# Patient Record
Sex: MALE | Race: WHITE | Employment: FULL TIME | URBAN - METROPOLITAN AREA
[De-identification: names, ages, dates, MRNs, and addresses within clinical notes are randomized per-mention and may not be internally consistent; named-entity substitution may affect disease eponyms.]

---

## 2018-04-16 ENCOUNTER — TELEPHONE (OUTPATIENT)
Dept: ORTHOPEDICS | Facility: CLINIC | Age: 58
End: 2018-04-16

## 2018-04-16 NOTE — LETTER
2018         Patient:  Sheldon Holguin  :   1960  MRN:     [id number]  Physician: AURELIO LYNCH    This is to certify that Mr. Filippo rosales is an emotional support animal.     Thank you,      Electronically signed by Aurelio Lynch MD

## 2020-03-23 ENCOUNTER — NURSE TRIAGE (OUTPATIENT)
Dept: NURSING | Facility: CLINIC | Age: 60
End: 2020-03-23

## 2020-03-23 ENCOUNTER — APPOINTMENT (OUTPATIENT)
Dept: CT IMAGING | Facility: CLINIC | Age: 60
End: 2020-03-23
Attending: PHYSICIAN ASSISTANT
Payer: COMMERCIAL

## 2020-03-23 ENCOUNTER — HOSPITAL ENCOUNTER (INPATIENT)
Facility: CLINIC | Age: 60
LOS: 4 days | Discharge: HOME | End: 2020-03-28
Attending: PHYSICIAN ASSISTANT | Admitting: INTERNAL MEDICINE
Payer: COMMERCIAL

## 2020-03-23 DIAGNOSIS — K85.90 ACUTE PANCREATITIS, UNSPECIFIED COMPLICATION STATUS, UNSPECIFIED PANCREATITIS TYPE: ICD-10-CM

## 2020-03-23 DIAGNOSIS — K85.20 ALCOHOL-INDUCED ACUTE PANCREATITIS, UNSPECIFIED COMPLICATION STATUS: Primary | ICD-10-CM

## 2020-03-23 LAB
ALBUMIN UR-MCNC: 50 MG/DL
ANION GAP SERPL CALCULATED.3IONS-SCNC: 6 MMOL/L (ref 3–14)
APPEARANCE UR: CLEAR
BASOPHILS # BLD AUTO: 0.1 10E9/L (ref 0–0.2)
BASOPHILS NFR BLD AUTO: 0.3 %
BILIRUB UR QL STRIP: NEGATIVE
BUN SERPL-MCNC: 13 MG/DL (ref 7–30)
CALCIUM SERPL-MCNC: 8.9 MG/DL (ref 8.5–10.1)
CHLORIDE SERPL-SCNC: 103 MMOL/L (ref 94–109)
CO2 SERPL-SCNC: 29 MMOL/L (ref 20–32)
COLOR UR AUTO: YELLOW
CREAT SERPL-MCNC: 1.14 MG/DL (ref 0.66–1.25)
DIFFERENTIAL METHOD BLD: ABNORMAL
EOSINOPHIL # BLD AUTO: 0.1 10E9/L (ref 0–0.7)
EOSINOPHIL NFR BLD AUTO: 0.3 %
ERYTHROCYTE [DISTWIDTH] IN BLOOD BY AUTOMATED COUNT: 13 % (ref 10–15)
GFR SERPL CREATININE-BSD FRML MDRD: 70 ML/MIN/{1.73_M2}
GLUCOSE SERPL-MCNC: 184 MG/DL (ref 70–99)
GLUCOSE UR STRIP-MCNC: NEGATIVE MG/DL
HCT VFR BLD AUTO: 49.2 % (ref 40–53)
HGB BLD-MCNC: 16.6 G/DL (ref 13.3–17.7)
HGB UR QL STRIP: NEGATIVE
IMM GRANULOCYTES # BLD: 0.1 10E9/L (ref 0–0.4)
IMM GRANULOCYTES NFR BLD: 0.5 %
KETONES UR STRIP-MCNC: 10 MG/DL
LEUKOCYTE ESTERASE UR QL STRIP: NEGATIVE
LYMPHOCYTES # BLD AUTO: 1.4 10E9/L (ref 0.8–5.3)
LYMPHOCYTES NFR BLD AUTO: 6.8 %
MCH RBC QN AUTO: 32.7 PG (ref 26.5–33)
MCHC RBC AUTO-ENTMCNC: 33.7 G/DL (ref 31.5–36.5)
MCV RBC AUTO: 97 FL (ref 78–100)
MONOCYTES # BLD AUTO: 1.7 10E9/L (ref 0–1.3)
MONOCYTES NFR BLD AUTO: 8.4 %
MUCOUS THREADS #/AREA URNS LPF: PRESENT /LPF
NEUTROPHILS # BLD AUTO: 16.8 10E9/L (ref 1.6–8.3)
NEUTROPHILS NFR BLD AUTO: 83.7 %
NITRATE UR QL: NEGATIVE
NRBC # BLD AUTO: 0 10*3/UL
NRBC BLD AUTO-RTO: 0 /100
PH UR STRIP: 5 PH (ref 5–7)
PLATELET # BLD AUTO: 300 10E9/L (ref 150–450)
POTASSIUM SERPL-SCNC: 4.2 MMOL/L (ref 3.4–5.3)
RBC # BLD AUTO: 5.07 10E12/L (ref 4.4–5.9)
RBC #/AREA URNS AUTO: 1 /HPF (ref 0–2)
SODIUM SERPL-SCNC: 138 MMOL/L (ref 133–144)
SOURCE: ABNORMAL
SP GR UR STRIP: 1.03 (ref 1–1.03)
SQUAMOUS #/AREA URNS AUTO: <1 /HPF (ref 0–1)
UROBILINOGEN UR STRIP-MCNC: NORMAL MG/DL (ref 0–2)
WBC # BLD AUTO: 20.1 10E9/L (ref 4–11)
WBC #/AREA URNS AUTO: 2 /HPF (ref 0–5)

## 2020-03-23 PROCEDURE — 80320 DRUG SCREEN QUANTALCOHOLS: CPT | Performed by: PHYSICIAN ASSISTANT

## 2020-03-23 PROCEDURE — 80048 BASIC METABOLIC PNL TOTAL CA: CPT | Performed by: PHYSICIAN ASSISTANT

## 2020-03-23 PROCEDURE — 25800030 ZZH RX IP 258 OP 636: Performed by: PHYSICIAN ASSISTANT

## 2020-03-23 PROCEDURE — 25000128 H RX IP 250 OP 636: Performed by: PHYSICIAN ASSISTANT

## 2020-03-23 PROCEDURE — 99285 EMERGENCY DEPT VISIT HI MDM: CPT | Mod: 25

## 2020-03-23 PROCEDURE — 83690 ASSAY OF LIPASE: CPT | Performed by: PHYSICIAN ASSISTANT

## 2020-03-23 PROCEDURE — 25000125 ZZHC RX 250: Performed by: PHYSICIAN ASSISTANT

## 2020-03-23 PROCEDURE — 80076 HEPATIC FUNCTION PANEL: CPT | Performed by: PHYSICIAN ASSISTANT

## 2020-03-23 PROCEDURE — 85025 COMPLETE CBC W/AUTO DIFF WBC: CPT | Performed by: PHYSICIAN ASSISTANT

## 2020-03-23 PROCEDURE — 96375 TX/PRO/DX INJ NEW DRUG ADDON: CPT

## 2020-03-23 PROCEDURE — 74177 CT ABD & PELVIS W/CONTRAST: CPT

## 2020-03-23 PROCEDURE — HZ2ZZZZ DETOXIFICATION SERVICES FOR SUBSTANCE ABUSE TREATMENT: ICD-10-PCS | Performed by: INTERNAL MEDICINE

## 2020-03-23 PROCEDURE — 96374 THER/PROPH/DIAG INJ IV PUSH: CPT | Mod: 59

## 2020-03-23 PROCEDURE — 96361 HYDRATE IV INFUSION ADD-ON: CPT

## 2020-03-23 PROCEDURE — 81001 URINALYSIS AUTO W/SCOPE: CPT | Performed by: PHYSICIAN ASSISTANT

## 2020-03-23 RX ORDER — KETOROLAC TROMETHAMINE 15 MG/ML
15 INJECTION, SOLUTION INTRAMUSCULAR; INTRAVENOUS ONCE
Status: COMPLETED | OUTPATIENT
Start: 2020-03-23 | End: 2020-03-23

## 2020-03-23 RX ORDER — HYDROMORPHONE HYDROCHLORIDE 1 MG/ML
0.5 INJECTION, SOLUTION INTRAMUSCULAR; INTRAVENOUS; SUBCUTANEOUS ONCE
Status: COMPLETED | OUTPATIENT
Start: 2020-03-23 | End: 2020-03-23

## 2020-03-23 RX ORDER — ONDANSETRON 2 MG/ML
4 INJECTION INTRAMUSCULAR; INTRAVENOUS ONCE
Status: COMPLETED | OUTPATIENT
Start: 2020-03-23 | End: 2020-03-23

## 2020-03-23 RX ORDER — IOPAMIDOL 755 MG/ML
500 INJECTION, SOLUTION INTRAVASCULAR ONCE
Status: COMPLETED | OUTPATIENT
Start: 2020-03-23 | End: 2020-03-23

## 2020-03-23 RX ADMIN — SODIUM CHLORIDE 1000 ML: 9 INJECTION, SOLUTION INTRAVENOUS at 22:32

## 2020-03-23 RX ADMIN — IOPAMIDOL 100 ML: 755 INJECTION, SOLUTION INTRAVENOUS at 23:18

## 2020-03-23 RX ADMIN — SODIUM CHLORIDE 65 ML: 9 INJECTION, SOLUTION INTRAVENOUS at 23:18

## 2020-03-23 RX ADMIN — ONDANSETRON 4 MG: 2 INJECTION INTRAMUSCULAR; INTRAVENOUS at 22:32

## 2020-03-23 RX ADMIN — HYDROMORPHONE HYDROCHLORIDE 0.5 MG: 1 INJECTION, SOLUTION INTRAMUSCULAR; INTRAVENOUS; SUBCUTANEOUS at 22:47

## 2020-03-23 RX ADMIN — KETOROLAC TROMETHAMINE 15 MG: 15 INJECTION, SOLUTION INTRAMUSCULAR; INTRAVENOUS at 22:35

## 2020-03-23 ASSESSMENT — ENCOUNTER SYMPTOMS
VOMITING: 1
DIAPHORESIS: 1
ABDOMINAL PAIN: 1
FLANK PAIN: 1
NAUSEA: 1

## 2020-03-23 ASSESSMENT — MIFFLIN-ST. JEOR: SCORE: 2082.71

## 2020-03-24 ENCOUNTER — APPOINTMENT (OUTPATIENT)
Dept: ULTRASOUND IMAGING | Facility: CLINIC | Age: 60
End: 2020-03-24
Attending: INTERNAL MEDICINE
Payer: COMMERCIAL

## 2020-03-24 ENCOUNTER — APPOINTMENT (OUTPATIENT)
Dept: GENERAL RADIOLOGY | Facility: CLINIC | Age: 60
End: 2020-03-24
Attending: INTERNAL MEDICINE
Payer: COMMERCIAL

## 2020-03-24 PROBLEM — K85.90 ACUTE PANCREATITIS: Status: ACTIVE | Noted: 2020-03-24

## 2020-03-24 PROBLEM — K85.20 ALCOHOL-INDUCED ACUTE PANCREATITIS: Status: ACTIVE | Noted: 2020-03-24

## 2020-03-24 LAB
ALBUMIN SERPL-MCNC: 4.2 G/DL (ref 3.4–5)
ALP SERPL-CCNC: 77 U/L (ref 40–150)
ALT SERPL W P-5'-P-CCNC: 33 U/L (ref 0–70)
ANION GAP SERPL CALCULATED.3IONS-SCNC: 7 MMOL/L (ref 3–14)
AST SERPL W P-5'-P-CCNC: 22 U/L (ref 0–45)
BILIRUB DIRECT SERPL-MCNC: 0.2 MG/DL (ref 0–0.2)
BILIRUB SERPL-MCNC: 0.6 MG/DL (ref 0.2–1.3)
BUN SERPL-MCNC: 14 MG/DL (ref 7–30)
CALCIUM SERPL-MCNC: 8.4 MG/DL (ref 8.5–10.1)
CHLORIDE SERPL-SCNC: 106 MMOL/L (ref 94–109)
CO2 SERPL-SCNC: 25 MMOL/L (ref 20–32)
CREAT SERPL-MCNC: 1.03 MG/DL (ref 0.66–1.25)
ERYTHROCYTE [DISTWIDTH] IN BLOOD BY AUTOMATED COUNT: 13.2 % (ref 10–15)
ETHANOL SERPL-MCNC: <0.01 G/DL
GFR SERPL CREATININE-BSD FRML MDRD: 80 ML/MIN/{1.73_M2}
GLUCOSE SERPL-MCNC: 139 MG/DL (ref 70–99)
HCT VFR BLD AUTO: 48.3 % (ref 40–53)
HGB BLD-MCNC: 15.8 G/DL (ref 13.3–17.7)
LACTATE BLD-SCNC: 1.1 MMOL/L (ref 0.7–2)
LIPASE SERPL-CCNC: 7929 U/L (ref 73–393)
LIPASE SERPL-CCNC: ABNORMAL U/L (ref 73–393)
MCH RBC QN AUTO: 33.2 PG (ref 26.5–33)
MCHC RBC AUTO-ENTMCNC: 32.7 G/DL (ref 31.5–36.5)
MCV RBC AUTO: 102 FL (ref 78–100)
PLATELET # BLD AUTO: 241 10E9/L (ref 150–450)
POTASSIUM SERPL-SCNC: 4.1 MMOL/L (ref 3.4–5.3)
PROT SERPL-MCNC: 7.6 G/DL (ref 6.8–8.8)
RBC # BLD AUTO: 4.76 10E12/L (ref 4.4–5.9)
SODIUM SERPL-SCNC: 138 MMOL/L (ref 133–144)
WBC # BLD AUTO: 14.7 10E9/L (ref 4–11)

## 2020-03-24 PROCEDURE — 25000128 H RX IP 250 OP 636: Performed by: INTERNAL MEDICINE

## 2020-03-24 PROCEDURE — 36415 COLL VENOUS BLD VENIPUNCTURE: CPT | Performed by: INTERNAL MEDICINE

## 2020-03-24 PROCEDURE — 99223 1ST HOSP IP/OBS HIGH 75: CPT | Mod: AI | Performed by: INTERNAL MEDICINE

## 2020-03-24 PROCEDURE — 80048 BASIC METABOLIC PNL TOTAL CA: CPT | Performed by: INTERNAL MEDICINE

## 2020-03-24 PROCEDURE — 83605 ASSAY OF LACTIC ACID: CPT | Performed by: INTERNAL MEDICINE

## 2020-03-24 PROCEDURE — 12000000 ZZH R&B MED SURG/OB

## 2020-03-24 PROCEDURE — U0001 2019-NCOV DIAGNOSTIC P: HCPCS | Performed by: INTERNAL MEDICINE

## 2020-03-24 PROCEDURE — 76700 US EXAM ABDOM COMPLETE: CPT

## 2020-03-24 PROCEDURE — 25000132 ZZH RX MED GY IP 250 OP 250 PS 637: Performed by: INTERNAL MEDICINE

## 2020-03-24 PROCEDURE — 71045 X-RAY EXAM CHEST 1 VIEW: CPT

## 2020-03-24 PROCEDURE — 85027 COMPLETE CBC AUTOMATED: CPT | Performed by: INTERNAL MEDICINE

## 2020-03-24 PROCEDURE — 25800030 ZZH RX IP 258 OP 636: Performed by: INTERNAL MEDICINE

## 2020-03-24 PROCEDURE — 83690 ASSAY OF LIPASE: CPT | Performed by: INTERNAL MEDICINE

## 2020-03-24 RX ORDER — NALOXONE HYDROCHLORIDE 0.4 MG/ML
.1-.4 INJECTION, SOLUTION INTRAMUSCULAR; INTRAVENOUS; SUBCUTANEOUS
Status: DISCONTINUED | OUTPATIENT
Start: 2020-03-24 | End: 2020-03-28 | Stop reason: HOSPADM

## 2020-03-24 RX ORDER — SODIUM CHLORIDE, SODIUM LACTATE, POTASSIUM CHLORIDE, CALCIUM CHLORIDE 600; 310; 30; 20 MG/100ML; MG/100ML; MG/100ML; MG/100ML
INJECTION, SOLUTION INTRAVENOUS CONTINUOUS
Status: DISCONTINUED | OUTPATIENT
Start: 2020-03-24 | End: 2020-03-25

## 2020-03-24 RX ORDER — POTASSIUM CHLORIDE 29.8 MG/ML
20 INJECTION INTRAVENOUS
Status: DISCONTINUED | OUTPATIENT
Start: 2020-03-24 | End: 2020-03-28 | Stop reason: HOSPADM

## 2020-03-24 RX ORDER — ONDANSETRON 2 MG/ML
4 INJECTION INTRAMUSCULAR; INTRAVENOUS EVERY 6 HOURS PRN
Status: DISCONTINUED | OUTPATIENT
Start: 2020-03-24 | End: 2020-03-28 | Stop reason: HOSPADM

## 2020-03-24 RX ORDER — POTASSIUM CHLORIDE 1500 MG/1
20-40 TABLET, EXTENDED RELEASE ORAL
Status: DISCONTINUED | OUTPATIENT
Start: 2020-03-24 | End: 2020-03-28 | Stop reason: HOSPADM

## 2020-03-24 RX ORDER — LIDOCAINE 40 MG/G
CREAM TOPICAL
Status: DISCONTINUED | OUTPATIENT
Start: 2020-03-24 | End: 2020-03-28 | Stop reason: HOSPADM

## 2020-03-24 RX ORDER — PROCHLORPERAZINE MALEATE 10 MG
10 TABLET ORAL EVERY 6 HOURS PRN
Status: DISCONTINUED | OUTPATIENT
Start: 2020-03-24 | End: 2020-03-28 | Stop reason: HOSPADM

## 2020-03-24 RX ORDER — PROCHLORPERAZINE 25 MG
25 SUPPOSITORY, RECTAL RECTAL EVERY 12 HOURS PRN
Status: DISCONTINUED | OUTPATIENT
Start: 2020-03-24 | End: 2020-03-28 | Stop reason: HOSPADM

## 2020-03-24 RX ORDER — ACETAMINOPHEN 325 MG/1
650 TABLET ORAL EVERY 4 HOURS PRN
Status: DISCONTINUED | OUTPATIENT
Start: 2020-03-24 | End: 2020-03-28 | Stop reason: HOSPADM

## 2020-03-24 RX ORDER — MULTIPLE VITAMINS W/ MINERALS TAB 9MG-400MCG
1 TAB ORAL DAILY
COMMUNITY

## 2020-03-24 RX ORDER — POTASSIUM CHLORIDE 7.45 MG/ML
10 INJECTION INTRAVENOUS
Status: DISCONTINUED | OUTPATIENT
Start: 2020-03-24 | End: 2020-03-28 | Stop reason: HOSPADM

## 2020-03-24 RX ORDER — BISACODYL 10 MG
10 SUPPOSITORY, RECTAL RECTAL DAILY PRN
Status: DISCONTINUED | OUTPATIENT
Start: 2020-03-24 | End: 2020-03-28 | Stop reason: HOSPADM

## 2020-03-24 RX ORDER — HYDROMORPHONE HYDROCHLORIDE 1 MG/ML
.3-.5 INJECTION, SOLUTION INTRAMUSCULAR; INTRAVENOUS; SUBCUTANEOUS
Status: DISCONTINUED | OUTPATIENT
Start: 2020-03-24 | End: 2020-03-27

## 2020-03-24 RX ORDER — MAGNESIUM SULFATE HEPTAHYDRATE 40 MG/ML
4 INJECTION, SOLUTION INTRAVENOUS EVERY 4 HOURS PRN
Status: DISCONTINUED | OUTPATIENT
Start: 2020-03-24 | End: 2020-03-28 | Stop reason: HOSPADM

## 2020-03-24 RX ORDER — ONDANSETRON 4 MG/1
4 TABLET, ORALLY DISINTEGRATING ORAL EVERY 6 HOURS PRN
Status: DISCONTINUED | OUTPATIENT
Start: 2020-03-24 | End: 2020-03-28 | Stop reason: HOSPADM

## 2020-03-24 RX ORDER — AMOXICILLIN 250 MG
1 CAPSULE ORAL 2 TIMES DAILY PRN
Status: DISCONTINUED | OUTPATIENT
Start: 2020-03-24 | End: 2020-03-28 | Stop reason: HOSPADM

## 2020-03-24 RX ORDER — AMOXICILLIN 250 MG
2 CAPSULE ORAL 2 TIMES DAILY PRN
Status: DISCONTINUED | OUTPATIENT
Start: 2020-03-24 | End: 2020-03-28 | Stop reason: HOSPADM

## 2020-03-24 RX ORDER — POTASSIUM CL/LIDO/0.9 % NACL 10MEQ/0.1L
10 INTRAVENOUS SOLUTION, PIGGYBACK (ML) INTRAVENOUS
Status: DISCONTINUED | OUTPATIENT
Start: 2020-03-24 | End: 2020-03-28 | Stop reason: HOSPADM

## 2020-03-24 RX ORDER — POTASSIUM CHLORIDE 1.5 G/1.58G
20-40 POWDER, FOR SOLUTION ORAL
Status: DISCONTINUED | OUTPATIENT
Start: 2020-03-24 | End: 2020-03-28 | Stop reason: HOSPADM

## 2020-03-24 RX ORDER — OXYCODONE HYDROCHLORIDE 5 MG/1
5-10 TABLET ORAL
Status: DISCONTINUED | OUTPATIENT
Start: 2020-03-24 | End: 2020-03-27

## 2020-03-24 RX ADMIN — HYDROMORPHONE HYDROCHLORIDE 0.5 MG: 1 INJECTION, SOLUTION INTRAMUSCULAR; INTRAVENOUS; SUBCUTANEOUS at 10:06

## 2020-03-24 RX ADMIN — OXYCODONE HYDROCHLORIDE 5 MG: 5 TABLET ORAL at 02:44

## 2020-03-24 RX ADMIN — SODIUM CHLORIDE, POTASSIUM CHLORIDE, SODIUM LACTATE AND CALCIUM CHLORIDE: 600; 310; 30; 20 INJECTION, SOLUTION INTRAVENOUS at 02:44

## 2020-03-24 RX ADMIN — SODIUM CHLORIDE, POTASSIUM CHLORIDE, SODIUM LACTATE AND CALCIUM CHLORIDE: 600; 310; 30; 20 INJECTION, SOLUTION INTRAVENOUS at 10:34

## 2020-03-24 RX ADMIN — OXYCODONE HYDROCHLORIDE 5 MG: 5 TABLET ORAL at 17:12

## 2020-03-24 RX ADMIN — OXYCODONE HYDROCHLORIDE 5 MG: 5 TABLET ORAL at 12:45

## 2020-03-24 RX ADMIN — PROCHLORPERAZINE EDISYLATE 10 MG: 5 INJECTION INTRAMUSCULAR; INTRAVENOUS at 04:14

## 2020-03-24 RX ADMIN — OXYCODONE HYDROCHLORIDE 5 MG: 5 TABLET ORAL at 20:29

## 2020-03-24 RX ADMIN — SODIUM CHLORIDE, POTASSIUM CHLORIDE, SODIUM LACTATE AND CALCIUM CHLORIDE: 600; 310; 30; 20 INJECTION, SOLUTION INTRAVENOUS at 18:33

## 2020-03-24 RX ADMIN — HYDROMORPHONE HYDROCHLORIDE 0.3 MG: 1 INJECTION, SOLUTION INTRAMUSCULAR; INTRAVENOUS; SUBCUTANEOUS at 04:03

## 2020-03-24 RX ADMIN — HYDROMORPHONE HYDROCHLORIDE 0.3 MG: 1 INJECTION, SOLUTION INTRAMUSCULAR; INTRAVENOUS; SUBCUTANEOUS at 06:08

## 2020-03-24 ASSESSMENT — ACTIVITIES OF DAILY LIVING (ADL)
ADLS_ACUITY_SCORE: 17
ADLS_ACUITY_SCORE: 19

## 2020-03-24 NOTE — ED PROVIDER NOTES
History     Chief Complaint:  Flank Pain    HPI   Sheldon Holguin is a 60 year old male who presents with flank pain. The patient reports to have had only coffee up until eating dinner and then having sudden central abdominal pain that he states is similar to the last time he had a kidney stone. He has been diaphoretic, nauseous, and vomited. He denies the pain to be localized to either flank, scrotal swelling, or scrotal pain.     Allergies:  No Known Drug Allergies     Medications:    Valium   Antivert    Past Medical History:    Kidney Stone    Past Surgical History:    Surgical history reviewed. No pertinent surgical history.     Family History:    Family history reviewed. No pertinent family history.     Social History:  Patient was not accompanied to the ED.   Smoking Status: Never Smoker  Alcohol Use: Negative   Drug Use: Positive    PCP: .Pallas, Kenneth G  Marital Status:   [2]    Review of Systems   Constitutional: Positive for diaphoresis.   Gastrointestinal: Positive for abdominal pain, nausea and vomiting.   Genitourinary: Positive for flank pain. Negative for penile pain, penile swelling, scrotal swelling and testicular pain.   All other systems reviewed and are negative.    Physical Exam   Vitals:   Patient Vitals for the past 24 hrs:   BP Temp Pulse Resp SpO2 Height Weight   03/23/20 2315 (!) 160/86 -- 74 -- 93 % -- --   03/23/20 2300 (!) 154/74 -- 75 -- 91 % -- --   03/23/20 2245 (!) 152/78 -- 60 -- 96 % -- --   03/23/20 2215 (!) 153/82 98  F (36.7  C) 63 18 96 % 1.829 m (6') 122.5 kg (270 lb)     Physical Exam  General: Alert and interactive. Appears very uncomfortable. Diaphoretic. Sitting on side of bed.   Eyes: The pupils are equal and round. EOMs intact. No scleral icterus.  ENT: No abnormalities to the external nose or ears. Mucous membranes moist. Posterior oropharynx is non-erythematous.   Neck: Trachea is in the midline. No nuchal rigidity.     CV: Regular rate and rhythm. S1 and S2  normal without murmur, click, gallop or rub.   Resp: Breath sounds are clear bilaterally, without rhonchi, wheezes, rales. Non-labored, no retractions or accessory muscle use.   GI: Abdomen is soft without distension. Tenderness diffusely throughout the abdomen, difficult to localize. No CVA tenderness.   MS: Moving all extremities well. Good muscle tone.   Skin: Warm and dry. No rash or lesions noted.  Neuro: Alert and oriented x 3. No focal neurologic deficits. Good strength and sensation in upper and lower extremities.    Psych: Awake. Alert.  Normal affect. Appropriate interactions.  Lymph: No anterior or posterior cervical lymphadenopathy noted.    Emergency Department Course     Imaging:  Radiology findings were communicated with the patient who voiced understanding of the findings.    CT Abdomen Pelvis w Contrast    1.  Evidence for acute interstitial edematous pancreatitis with diffuse enlargement of the pancreas without evidence for necrotizing pancreatitis. No ductal dilatation. Associated acute peripancreatic fluid collection conforming to the borders of the   retroperitoneum. No evidence for peripancreatic necrosis.    2.  No evidence for common bile duct dilatation. No evidence for thrombus of the splenic vein or pseudoaneurysm formation.    3.  Diffuse fatty infiltration of the liver.    4.  Low-attenuation lesion superior aspect of the spleen likely represents a cyst or hemangioma.    5.  Colonic diverticulosis without diverticulitis.    Reading per radiology     Laboratory:  Laboratory findings were communicated with the patient who voiced understanding of the findings.    UA with Microscopic: Ketone 10 (A) Protein Albumin 50 (A) Mucous present (A) o/w WNL     CBC: WBC 20.1 (H) , HGB 16.6,   BMP: Glucose 184 (H) , o/w WNL (Creatinine 1.14)    Hepatic Panel: WNL  Lipase: 28, 587  Alcohol ethyl: <0.01    Interventions:  2232 NS 1000 mL IV   2232 Zofran 4 mg IV   2235 Toradol 15 mg  IV  2247 Dilaudid 0.5 mg IV     Emergency Department Course:    2212 Nursing notes and vitals reviewed.    2225 I performed an exam of the patient as documented above.     2224 IV was inserted and blood was drawn for laboratory testing, results above.     2237 The patient provided a urine sample here in the emergency department. This was sent for laboratory testing, findings above.     2317 The patient was sent for a CT while in the emergency department, results above.      0018 Consulted with Dr. Cobb to evaluate patient.    0013 Findings and plan explained to the Patient who consents to admission. Discussed the patient with Dr. Cochran, who will admit the patient to a med [facility] bed for further monitoring, evaluation, and treatment.     Impression & Plan     Medical Decision Making:  Sheldon Holguin is a 60 year old male who presents for evaluation of diffuse abdominal pain, acute in onset this evening. The differential diagnosis would include GERD, GIB, esophageal spasm, atypical cardiac symptoms, biliary colic or gallstone disease, AAA, gastroenteritis, gastritis, bowel pathology, or others. CT shows diffuse inflammation within and surrounding the pancreas. No signs of necrotizing pancreatitis but there is a possible fluid collection surrounding pancreas. Patient has leukocytosis of 20K but no signs of electrolyte abnormalities, renal dysfunction, or anemia. Lipase is 28,000. Suspect this in part could be due to recent bout of drinking over [place]. Given CT findings, will admit for pain control, IV hydration, RUQ US, definitive management. Discussed care with Dr. Cochran, who will admit to an inpatient medical bed for further evaluation and treatment.     Diagnosis:    ICD-10-CM    1. Acute pancreatitis, unspecified complication status, unspecified pancreatitis type  K85.90 Hepatic panel     Hepatic panel     Lipase     Lipase     CANCELED: Lipase     CANCELED: Hepatic panel       Disposition:  The  patient is admitted to a medical bed     Scribe Disclosure:  I, August Wolfgang, am serving as a scribe at 11:03 PM on 3/23/2020 to document services personally performed by Pratibha Gallardo PAC based on my observations and the provider's statements to me.      [facility] EMERGENCY DEPARTMENT       Pratibha Gallardo PA-C  03/24/20 0121

## 2020-03-24 NOTE — TELEPHONE ENCOUNTER
"\"I am having lower left abdominal pain and I was having back pain. I can't really tell now it hurts so bad. I can't even straighten up all the way.(rates pain 10/10). Sx started around 6:30pm. I have had kidney stones before and that's what it feels like. I have also vomited at least 6 times.\" denies other sx. Triaged  And advised ER.  Gerda Mclain RN [facility]        Reason for Disposition    Patient sounds very sick or weak to the triager    Additional Information    Negative: Chest pain    Negative: Pain is mainly in upper abdomen  (if needed ask: \"is it mainly above the belly button?\")    Negative: Followed an abdomen (stomach) injury    Negative: [1] SEVERE pain (e.g., excruciating) AND [2] present > 1 hour    Negative: [1] SEVERE pain AND [2] age > 60    Negative: [1] Vomiting AND [2] contains red blood or black (\"coffee ground\") material  (Exception: few red streaks in vomit that only happened once)    Negative: Blood in bowel movements  (Exception: Blood on surface of BM with constipation)    Negative: Black or tarry bowel movements  (Exception: chronic-unchanged  black-grey bowel movements AND is taking iron pills or Pepto-bismol)    Negative: [1] Unable to urinate (or only a few drops) > 4 hours AND [2] bladder feels very full (e.g., palpable bladder or strong urge to urinate)    Negative: [1] Pain in the scrotum or testicle AND [2] present > 1 hour    Protocols used: ABDOMINAL PAIN - MALE-A-AH      "

## 2020-03-24 NOTE — H&P
[facility]  History and Physical   Hospitalist Service    Asael Cochran MD    Sheldon Holguin MRN# [id number]   YOB: 1960 Age: 60 year old      Date of Admission:  3/23/2020           Assessment and Plan:   Sheldon Holguin is a 60-year-old male with history of renal stone disease and vertigo.  He presented to the emergency department last night for evaluation of back and abdominal pain.  The symptoms had been dull for a couple of days but became much worse last night.  He thought he had recurrent kidney stones.  Emergency department evaluation showed pancreatitis by CT of abdomen and pelvis.  Lipase was checked and was elevated at 28,587.  Sheldon denies frequent heavy drinking but did drink quite heavily over [place] weekend.  He states he also had a couple of Scotches each night over this past weekend (2 and 3 nights ago).  He denies fevers or chills.  He has a chronic cough that he has had for months or years that he attributes to working as a  and being around chlorine all the time.  He was in [place] for [place] weekend but has not had any foreign travel.  Emergency department evaluation showed stable vital signs. Basic metabolic panel, liver function tests, and urine analysis were unremarkable.  Lipase was elevated at 28,587.  White blood cell count was elevated at 20.1 and CBC was otherwise unremarkable.  CT of abdomen and pelvis showed no ureter stones but did show acute pancreatitis.  There was also diffuse fatty liver infiltration.  I was asked to admit Sheldon to the hospital with acute pancreatitis.    Problem list:    1.  Acute pancreatitis.  This is most likely due to alcohol use.  I suspect he is minimizing his alcohol use.  He did drink heavily 12 and 13 days ago over [place] weekend in [place].  He admitted to me that he had a couple of scotches on 2 nights over this past weekend. He does have fatty liver on CT.  Treat with n.p.o., IV  fluids, analgesics, and antiemetics.  I will obtain an ultrasound.  Lipase will be repeated this morning.    2.  Leukocytosis, most likely due to acute pancreatitis.  Repeat CBC this morning.    3.  Fatty liver.     Full code  Mechanical DVT prophylaxis  Disposition: Admit as inpatient       Code Status:   Full Code         Primary Care Physician:   Pallas, Kenneth G [phone]         Chief Complaint:   Abdominal and back pain    History is obtained from Sheldon, ED provider (Pratibha Gallardo PA-C), and the medical record.         History of Present Illness:   Sheldon Holguin is a 60-year-old male with history of renal stone disease and vertigo.  He presented to the emergency department last night for evaluation of back and abdominal pain.  The symptoms had been dull for a couple of days but became much worse last night.  He thought he had recurrent kidney stones.  Emergency department evaluation showed pancreatitis by CT of abdomen and pelvis.  Lipase was checked and was elevated at 28,587.  Sheldon denies frequent heavy drinking but did drink quite heavily over [place] weekend.  He states he also had a couple of Scotches each night over this past weekend (2 and 3 nights ago).  He denies fevers or chills.  He has a chronic cough that he has had for months or years that he attributes to working as a  and being around chlorine all the time.  He was in [place] for [place] weekend but has not had any foreign travel.  Emergency department evaluation showed stable vital signs. Basic metabolic panel, liver function tests, and urine analysis were unremarkable.  Lipase was elevated at 28,587.  White blood cell count was elevated at 20.1 and CBC was otherwise unremarkable.  CT of abdomen and pelvis showed no ureter stones but did show acute pancreatitis.  There was also diffuse fatty liver infiltration.  I was asked to admit Sheldon to the hospital with acute pancreatitis.           Past Medical  History:     Patient Active Problem List   Diagnosis     Alcohol-induced acute pancreatitis      Real stone disease  Vertigo          Past Surgical History:   None         Home Medications:     Prior to Admission medications    Medication Sig Last Dose Taking? Auth Provider   diazepam (VALIUM) 5 MG tablet Take 1 tablet (5 mg) by mouth every 6 hours as needed for other (dizziness)   Jadyn Banegas MD   meclizine (ANTIVERT) 25 MG tablet Take 1 tablet (25 mg) by mouth 3 times daily as needed for dizziness   Jadyn Banegas MD            Allergies:   No Known Allergies         Social History:     Non smoker  Drinks alcohol          Family History:   No pancreatic diseae           Review of Systems:   The 10 point Review of Systems is negative other than as noted in the HPI.           Physical Exam:   Blood pressure (!) 166/78, pulse 82, temperature 98  F (36.7  C), resp. rate 18, height 1.829 m (6'), weight 122.5 kg (270 lb), SpO2 93 %.  270 lbs 0 oz      GENERAL: Pleasant and cooperative. No acute distress.  EYES: Pupils equal and round. No scleral erythema or icterus.  ENT: External ears are normal without deformity. Posterior oropharynx is without erythem, swelling, or exudate.  NECK: Supple. No masses or swelling. No tenderness. Thyroid is normal without mass or tenderness.  CHEST: Clear to auscultation. Normal breath sounds. No retractions.   CV: Regular rate and rhythm. No JVD. Pulses normal.  ABDOMEN: Bowel sounds present. Epigastric tenderness. No masses or hernia.  EXTREMETIES: No clubbing, cyanosis, or ischemia.  SKIN: Warm and dry to touch. No wounds or rashes.  NEUROLOGIC: Strength and sensation are normal. Deep tendon reflexes are normal. Cranial nerves are normal.             Data:   All new lab and imaging data was reviewed.     Results for orders placed or performed during the hospital encounter of 03/23/20 (from the past 24 hour(s))   CBC with platelets differential   Result Value Ref  Range    WBC 20.1 (H) 4.0 - 11.0 10e9/L    RBC Count 5.07 4.4 - 5.9 10e12/L    Hemoglobin 16.6 13.3 - 17.7 g/dL    Hematocrit 49.2 40.0 - 53.0 %    MCV 97 78 - 100 fl    MCH 32.7 26.5 - 33.0 pg    MCHC 33.7 31.5 - 36.5 g/dL    RDW 13.0 10.0 - 15.0 %    Platelet Count 300 150 - 450 10e9/L    Diff Method Automated Method     % Neutrophils 83.7 %    % Lymphocytes 6.8 %    % Monocytes 8.4 %    % Eosinophils 0.3 %    % Basophils 0.3 %    % Immature Granulocytes 0.5 %    Nucleated RBCs 0 0 /100    Absolute Neutrophil 16.8 (H) 1.6 - 8.3 10e9/L    Absolute Lymphocytes 1.4 0.8 - 5.3 10e9/L    Absolute Monocytes 1.7 (H) 0.0 - 1.3 10e9/L    Absolute Eosinophils 0.1 0.0 - 0.7 10e9/L    Absolute Basophils 0.1 0.0 - 0.2 10e9/L    Abs Immature Granulocytes 0.1 0 - 0.4 10e9/L    Absolute Nucleated RBC 0.0    Basic metabolic panel   Result Value Ref Range    Sodium 138 133 - 144 mmol/L    Potassium 4.2 3.4 - 5.3 mmol/L    Chloride 103 94 - 109 mmol/L    Carbon Dioxide 29 20 - 32 mmol/L    Anion Gap 6 3 - 14 mmol/L    Glucose 184 (H) 70 - 99 mg/dL    Urea Nitrogen 13 7 - 30 mg/dL    Creatinine 1.14 0.66 - 1.25 mg/dL    GFR Estimate 70 >60 mL/min/[1.73_m2]    GFR Estimate If Black 82 >60 mL/min/[1.73_m2]    Calcium 8.9 8.5 - 10.1 mg/dL   Hepatic panel   Result Value Ref Range    Bilirubin Direct 0.2 0.0 - 0.2 mg/dL    Bilirubin Total 0.6 0.2 - 1.3 mg/dL    Albumin 4.2 3.4 - 5.0 g/dL    Protein Total 7.6 6.8 - 8.8 g/dL    Alkaline Phosphatase 77 40 - 150 U/L    ALT 33 0 - 70 U/L    AST 22 0 - 45 U/L   Lipase   Result Value Ref Range    Lipase 28,587 (H) 73 - 393 U/L   Alcohol ethyl   Result Value Ref Range    Ethanol g/dL <0.01 <0.01 g/dL   UA with Microscopic   Result Value Ref Range    Color Urine Yellow     Appearance Urine Clear     Glucose Urine Negative NEG^Negative mg/dL    Bilirubin Urine Negative NEG^Negative    Ketones Urine 10 (A) NEG^Negative mg/dL    Specific Gravity Urine 1.029 1.003 - 1.035    Blood Urine Negative  NEG^Negative    pH Urine 5.0 5.0 - 7.0 pH    Protein Albumin Urine 50 (A) NEG^Negative mg/dL    Urobilinogen mg/dL Normal 0.0 - 2.0 mg/dL    Nitrite Urine Negative NEG^Negative    Leukocyte Esterase Urine Negative NEG^Negative    Source Midstream Urine     WBC Urine 2 0 - 5 /HPF    RBC Urine 1 0 - 2 /HPF    Squamous Epithelial /HPF Urine <1 0 - 1 /HPF    Mucous Urine Present (A) NEG^Negative /LPF   CT Abdomen Pelvis w Contrast    Narrative    EXAM: CT ABDOMEN PELVIS W CONTRAST  LOCATION: [facility]  DATE/TIME: 3/23/2020 11:16 PM    INDICATION: Central abdominal pain with slightly more focal tenderness in the left lower quadrant of the abdomen with guarding on physical examination.    COMPARISON: None.    TECHNIQUE: CT scan of the abdomen and pelvis was performed following injection of IV contrast. Multiplanar reformats were obtained. Dose reduction techniques were used.    CONTRAST: 100 mL Isovue-370.    FINDINGS:   LOWER CHEST: Minimal linear atelectasis involving both lower lungs. No focal infiltrate, consolidation or pleural fluid.    HEPATOBILIARY: Diffuse fatty infiltration of the liver. No calcified gallstones or biliary dilatation. Portal vein patent.    PANCREAS: Diffuse pancreatic enlargement with diffuse interstitial edema throughout the entirety of the pancreas. Findings compatible with acute interstitial edematous pancreatitis. No evidence for necrotizing pancreatitis or ductal dilatation. Minimal   to moderate peripancreatic fluid conforming to the borders of the retroperitoneum compatible with acute peripancreatic fluid collection. No evidence for pancreatic necrosis.    SPLEEN: Normal-sized spleen. Well-circumscribed low-attenuation lesion superior aspect of the spleen likely representing a small cyst or hemangioma. Splenic vein patent. No definitive evidence for splenic artery pseudoaneurysm.    ADRENAL GLANDS: No significant nodules.    KIDNEYS/BLADDER: Symmetric contrast intake into  both kidneys. Bilateral subcentimeter low-attenuation renal lesions most compatible with cysts, for which there are no further follow-up recommendations. No urinary collecting system dilatation. No overt   bladder or prostate abnormality.    BOWEL: Colonic diverticulosis without evidence for diverticulitis. Appendix unremarkable. No small bowel dilatation. Mild reactive bowel wall thickening to the second and third portions of the duodenum due to the inflammatory change along the   duodenal/pancreatic groove. There is suggestion of mild wall thickening to the greater curvature of the stomach due to adjacent inflammatory change. Distal esophagus unremarkable.    LYMPH NODES: No lymphadenopathy.    VASCULATURE: Normal caliber abdominal aorta. Atherosclerotic vascular calcification.    MUSCULOSKELETAL: Chronic bilateral L5 pars interarticularis defects without significant spondylolisthesis of L5 on S1. Minimal scattered areas of degenerative disc changes throughout the thoracic and lumbar spine.      Impression    IMPRESSION:   1.  Evidence for acute interstitial edematous pancreatitis with diffuse enlargement of the pancreas without evidence for necrotizing pancreatitis. No ductal dilatation. Associated acute peripancreatic fluid collection conforming to the borders of the   retroperitoneum. No evidence for peripancreatic necrosis.    2.  No evidence for common bile duct dilatation. No evidence for thrombus of the splenic vein or pseudoaneurysm formation.    3.  Diffuse fatty infiltration of the liver.    4.  Low-attenuation lesion superior aspect of the spleen likely represents a cyst or hemangioma.    5.  Colonic diverticulosis without diverticulitis.

## 2020-03-24 NOTE — PLAN OF CARE
Pt has pain 5/10 in abdomen not localized. Gave oxycodone and dilaudid. Decrease in pain. Compazine decreased nausea. Stand by assist. 125 ml per hour LR infusing R PIV.

## 2020-03-24 NOTE — PROVIDER NOTIFICATION
Web based paging  Pt has moderate nausea. Next Zofran is not due until 0430. Please advise.       Doctor responded and recommended Compazine.

## 2020-03-24 NOTE — PHARMACY-ADMISSION MEDICATION HISTORY
Admission medication history interview status for this patient is complete. See [facility] admission navigator for allergy information, prior to admission medications and immunization status.     Medication history interview source(s):Patient  Medication history resources (including written lists, pill bottles, clinic record): [facility] list  Primary pharmacy:[facility]    Changes made to PTA medication list:  Added: Multivit  Deleted: Valium, Meclizine.  Changed: none    Actions taken by pharmacist (provider contacted, etc):None     Additional medication history information:None    Medication reconciliation/reorder completed by provider prior to medication history? No      Prior to Admission medications    Medication Sig Last Dose Taking? Auth Provider   multivitamin w/minerals (THERA-VIT-M) tablet Take 1 tablet by mouth daily 3/22/2020 at Unknown time Yes Unknown, Entered By History

## 2020-03-24 NOTE — ED PROVIDER NOTES
Emergency Department Attending Supervision Note  3/24/2020  1:24 AM      I evaluated this patient in conjunction with Pratibha Gallardo      Briefly, the patient presented with  Abdominal pain      On my exam, well appearing, abd- BS+ TTP epigastric area, no gaurding or rebound    Results:    ED course:    My impression is Pancreatitis will admit for Bowel rest, fluid and pain control        Diagnosis    ICD-10-CM    1. Acute pancreatitis, unspecified complication status, unspecified pancreatitis type  K85.90 Hepatic panel     Hepatic panel     Lipase     Lipase     Alcohol ethyl     Alcohol ethyl     CANCELED: Lipase     CANCELED: Hepatic panel     CANCELED: Alcohol level blood         Pratibha Gallardo, *        Paul Cobb MD  03/24/20 0126

## 2020-03-24 NOTE — ED TRIAGE NOTES
Around 1800 pt started having back pain that has migrated to the front of his abd. Pt states he has had kidney stones before and this is how it feels. NV+.

## 2020-03-24 NOTE — PLAN OF CARE
BP (!) 147/78 (BP Location: Left arm)   Pulse 82   Temp 100.3  F (37.9  C) (Oral)   Resp 18   Ht 1.829 m (6')   Wt 122.5 kg (270 lb)   SpO2 92%   BMI 36.62 kg/m    Pt is A&Ox4, LS Clear on RA, Has chronic cough-infrequent. BS+, Last BM overnight, Abd pain given oxy and dilaudid for pain. Abd is rounded and firm. Denies N/T, Nausea and SOB. Good UOP. LR running at 125 ml/hr. Pt currently on Droplet/contact precautions for COVID-19 rule out, swab pending. Pt had Abd/peliv US liver fatty infiltrates. CXR cannot exclude L lung PNA, Hypoinflated lungs & bibasilar atelectasis. Up IND. Plan wait for abd pain to subside, start diet after and wait for results of COVID-19 rule out.

## 2020-03-24 NOTE — ED NOTES
[facility]  ED Nurse Handoff Report    Sheldon Holguin is a 60 year old male   ED Chief complaint: Flank Pain  . ED Diagnosis:   Final diagnoses:   Acute pancreatitis, unspecified complication status, unspecified pancreatitis type     Allergies: No Known Allergies    Code Status: Full Code  Activity level - Baseline/Home:  Independent. Activity Level - Current:   Stand by Assist. Lift room needed: No. Bariatric: No   Needed: No   Isolation: No. Infection: Not Applicable.     Vital Signs:   Vitals:    03/23/20 2215 03/23/20 2245 03/23/20 2300 03/23/20 2315   BP: (!) 153/82 (!) 152/78 (!) 154/74 (!) 160/86   Pulse: 63 60 75 74   Resp: 18      Temp: 98  F (36.7  C)      SpO2: 96% 96% 91% 93%   Weight: 122.5 kg (270 lb)      Height: 1.829 m (6')          Cardiac Rhythm:  ,      Pain level: 0-10 Pain Scale: 4  Patient confused: No. Patient Falls Risk: Yes.   Elimination Status: Has voided   Patient Report - Initial Complaint: back pain that started this evening.with history of kidney stones   Tests Performed:   Abnormal Labs Reviewed   CBC WITH PLATELETS DIFFERENTIAL - Abnormal; Notable for the following components:       Result Value    WBC 20.1 (*)     Absolute Neutrophil 16.8 (*)     Absolute Monocytes 1.7 (*)     All other components within normal limits   BASIC METABOLIC PANEL - Abnormal; Notable for the following components:    Glucose 184 (*)     All other components within normal limits   ROUTINE UA WITH MICROSCOPIC - Abnormal; Notable for the following components:    Ketones Urine 10 (*)     Protein Albumin Urine 50 (*)     Mucous Urine Present (*)     All other components within normal limits   . Abnormal Results: see above, Abd CT-pancreatitis   Treatments provided: meds, IVF  Family Comments: family aware of admission  OBS brochure/video discussed/provided to patient:  N/A  ED Medications:   Medications   0.9% sodium chloride BOLUS (1,000 mLs Intravenous New Bag 3/23/20 2232)    ondansetron (ZOFRAN) injection 4 mg (4 mg Intravenous Given 3/23/20 2232)   ketorolac (TORADOL) injection 15 mg (15 mg Intravenous Given 3/23/20 2235)   HYDROmorphone (PF) (DILAUDID) injection 0.5 mg (0.5 mg Intravenous Given 3/23/20 2247)   CT Scan Flush (65 mLs Intravenous Given 3/23/20 2318)   iopamidol (ISOVUE-370) solution 500 mL (100 mLs Intravenous Given 3/23/20 2318)     Drips infusing:  Yes  For the majority of the shift, the patient's behavior Green.    Sepsis treatment initiated: No       ED Nurse Name/Phone Number: Lissa Boyer RN,   12:30 AM  RECEIVING UNIT ED HANDOFF REVIEW    Above ED Nurse Handoff Report was reviewed: Yes  Reviewed by: Sharri Gant on March 24, 2020 at 1:26 AM

## 2020-03-24 NOTE — PROGRESS NOTES
Patient seen and examined.  Chart reviewed.  Please see admission history and physical by Dr. Cochran from earlier this morning for details.    Continue to treat acute pancreatitis with n.p.o. diet status, IV fluids, and pain medications as needed.    Of note, nurse had notified me that patient had a cough and low-grade temperature elevation prior to entering.  Also has recent travel to [place].    Both the nurse and I wore personal protective equipment into the room to evaluate patient today.    COVID 19 testing sent.

## 2020-03-25 LAB
ALBUMIN SERPL-MCNC: 3.7 G/DL (ref 3.4–5)
ALP SERPL-CCNC: 69 U/L (ref 40–150)
ALT SERPL W P-5'-P-CCNC: 20 U/L (ref 0–70)
ANION GAP SERPL CALCULATED.3IONS-SCNC: 4 MMOL/L (ref 3–14)
AST SERPL W P-5'-P-CCNC: 14 U/L (ref 0–45)
BILIRUB SERPL-MCNC: 1.5 MG/DL (ref 0.2–1.3)
BUN SERPL-MCNC: 9 MG/DL (ref 7–30)
CALCIUM SERPL-MCNC: 8.5 MG/DL (ref 8.5–10.1)
CHLORIDE SERPL-SCNC: 105 MMOL/L (ref 94–109)
CO2 SERPL-SCNC: 28 MMOL/L (ref 20–32)
CREAT SERPL-MCNC: 0.92 MG/DL (ref 0.66–1.25)
ERYTHROCYTE [DISTWIDTH] IN BLOOD BY AUTOMATED COUNT: 13.5 % (ref 10–15)
GFR SERPL CREATININE-BSD FRML MDRD: >90 ML/MIN/{1.73_M2}
GLUCOSE SERPL-MCNC: 106 MG/DL (ref 70–99)
HCT VFR BLD AUTO: 49.2 % (ref 40–53)
HGB BLD-MCNC: 16.1 G/DL (ref 13.3–17.7)
LIPASE SERPL-CCNC: 1893 U/L (ref 73–393)
MAGNESIUM SERPL-MCNC: 2 MG/DL (ref 1.6–2.3)
MCH RBC QN AUTO: 33.4 PG (ref 26.5–33)
MCHC RBC AUTO-ENTMCNC: 32.7 G/DL (ref 31.5–36.5)
MCV RBC AUTO: 102 FL (ref 78–100)
PLATELET # BLD AUTO: 230 10E9/L (ref 150–450)
POTASSIUM SERPL-SCNC: 4 MMOL/L (ref 3.4–5.3)
PROT SERPL-MCNC: 7.3 G/DL (ref 6.8–8.8)
RBC # BLD AUTO: 4.82 10E12/L (ref 4.4–5.9)
SODIUM SERPL-SCNC: 137 MMOL/L (ref 133–144)
WBC # BLD AUTO: 18.2 10E9/L (ref 4–11)

## 2020-03-25 PROCEDURE — 25800030 ZZH RX IP 258 OP 636: Performed by: INTERNAL MEDICINE

## 2020-03-25 PROCEDURE — 83735 ASSAY OF MAGNESIUM: CPT | Performed by: INTERNAL MEDICINE

## 2020-03-25 PROCEDURE — 25000128 H RX IP 250 OP 636: Performed by: INTERNAL MEDICINE

## 2020-03-25 PROCEDURE — 85027 COMPLETE CBC AUTOMATED: CPT | Performed by: INTERNAL MEDICINE

## 2020-03-25 PROCEDURE — 25000125 ZZHC RX 250: Performed by: INTERNAL MEDICINE

## 2020-03-25 PROCEDURE — 83690 ASSAY OF LIPASE: CPT | Performed by: INTERNAL MEDICINE

## 2020-03-25 PROCEDURE — 99232 SBSQ HOSP IP/OBS MODERATE 35: CPT | Performed by: INTERNAL MEDICINE

## 2020-03-25 PROCEDURE — 12000000 ZZH R&B MED SURG/OB

## 2020-03-25 PROCEDURE — 25000132 ZZH RX MED GY IP 250 OP 250 PS 637: Performed by: INTERNAL MEDICINE

## 2020-03-25 PROCEDURE — 80053 COMPREHEN METABOLIC PANEL: CPT | Performed by: INTERNAL MEDICINE

## 2020-03-25 RX ORDER — HYDRALAZINE HYDROCHLORIDE 20 MG/ML
10 INJECTION INTRAMUSCULAR; INTRAVENOUS EVERY 4 HOURS PRN
Status: DISCONTINUED | OUTPATIENT
Start: 2020-03-25 | End: 2020-03-28 | Stop reason: HOSPADM

## 2020-03-25 RX ORDER — LORAZEPAM 2 MG/ML
1-2 INJECTION INTRAMUSCULAR EVERY 30 MIN PRN
Status: DISCONTINUED | OUTPATIENT
Start: 2020-03-25 | End: 2020-03-27

## 2020-03-25 RX ORDER — CEFTRIAXONE 2 G/1
2 INJECTION, POWDER, FOR SOLUTION INTRAMUSCULAR; INTRAVENOUS EVERY 24 HOURS
Status: DISCONTINUED | OUTPATIENT
Start: 2020-03-25 | End: 2020-03-28 | Stop reason: HOSPADM

## 2020-03-25 RX ORDER — SODIUM CHLORIDE 9 MG/ML
INJECTION, SOLUTION INTRAVENOUS CONTINUOUS
Status: DISCONTINUED | OUTPATIENT
Start: 2020-03-25 | End: 2020-03-27

## 2020-03-25 RX ORDER — METOPROLOL TARTRATE 1 MG/ML
5 INJECTION, SOLUTION INTRAVENOUS EVERY 6 HOURS
Status: DISCONTINUED | OUTPATIENT
Start: 2020-03-25 | End: 2020-03-27

## 2020-03-25 RX ORDER — LORAZEPAM 1 MG/1
1-2 TABLET ORAL EVERY 30 MIN PRN
Status: DISCONTINUED | OUTPATIENT
Start: 2020-03-25 | End: 2020-03-27

## 2020-03-25 RX ADMIN — OXYCODONE HYDROCHLORIDE 5 MG: 5 TABLET ORAL at 06:30

## 2020-03-25 RX ADMIN — OXYCODONE HYDROCHLORIDE 5 MG: 5 TABLET ORAL at 12:07

## 2020-03-25 RX ADMIN — AZITHROMYCIN MONOHYDRATE 500 MG: 500 INJECTION, POWDER, LYOPHILIZED, FOR SOLUTION INTRAVENOUS at 10:20

## 2020-03-25 RX ADMIN — OXYCODONE HYDROCHLORIDE 5 MG: 5 TABLET ORAL at 18:13

## 2020-03-25 RX ADMIN — SODIUM CHLORIDE, POTASSIUM CHLORIDE, SODIUM LACTATE AND CALCIUM CHLORIDE: 600; 310; 30; 20 INJECTION, SOLUTION INTRAVENOUS at 02:30

## 2020-03-25 RX ADMIN — SODIUM CHLORIDE, PRESERVATIVE FREE: 5 INJECTION INTRAVENOUS at 08:55

## 2020-03-25 RX ADMIN — CEFTRIAXONE 2 G: 2 INJECTION, POWDER, FOR SOLUTION INTRAMUSCULAR; INTRAVENOUS at 08:56

## 2020-03-25 RX ADMIN — HYDROMORPHONE HYDROCHLORIDE 0.5 MG: 1 INJECTION, SOLUTION INTRAMUSCULAR; INTRAVENOUS; SUBCUTANEOUS at 00:24

## 2020-03-25 RX ADMIN — OXYCODONE HYDROCHLORIDE 5 MG: 5 TABLET ORAL at 09:28

## 2020-03-25 RX ADMIN — METOPROLOL TARTRATE 5 MG: 5 INJECTION INTRAVENOUS at 08:56

## 2020-03-25 RX ADMIN — METOPROLOL TARTRATE 5 MG: 5 INJECTION INTRAVENOUS at 14:39

## 2020-03-25 RX ADMIN — OXYCODONE HYDROCHLORIDE 10 MG: 5 TABLET ORAL at 02:33

## 2020-03-25 RX ADMIN — OXYCODONE HYDROCHLORIDE 5 MG: 5 TABLET ORAL at 15:04

## 2020-03-25 RX ADMIN — METOPROLOL TARTRATE 5 MG: 5 INJECTION INTRAVENOUS at 20:18

## 2020-03-25 RX ADMIN — FOLIC ACID: 5 INJECTION, SOLUTION INTRAMUSCULAR; INTRAVENOUS; SUBCUTANEOUS at 13:26

## 2020-03-25 RX ADMIN — OXYCODONE HYDROCHLORIDE 5 MG: 5 TABLET ORAL at 21:59

## 2020-03-25 ASSESSMENT — ACTIVITIES OF DAILY LIVING (ADL)
ADLS_ACUITY_SCORE: 19
ADLS_ACUITY_SCORE: 12
ADLS_ACUITY_SCORE: 12
ADLS_ACUITY_SCORE: 19

## 2020-03-25 NOTE — PROGRESS NOTES
[facility]    Medicine Progress Note - Hospitalist Service       Date of Admission:  3/23/2020  Assessment & Plan   Mr. Holguin is a 60-year-old male with a past medical history significant for nephrolithiasis.  He had initially presented to emergency room with abdominal pain.  He was found to have acute pancreatitis.  This is likely related to recent binge drinking on a trip to [place].    1.  Acute pancreatitis.  Likely related to recent heavy alcohol use.  Symptoms are mildly improved today.  Continue n.p.o. diet status at this time per patient request.  Lipase numbers to continue to improve.  Start clear liquid diet when he feels that he is able to tolerate.  Continue pain medications as needed.  IV fluids while n.p.o.  Did advise avoidance of alcohol in the future.    2.  Possible pneumonia.  Did have some very mild abnormalities on chest x-ray from yesterday.  Developed fever to 100.7  F yesterday afternoon.  Does have a cough.  Now start antibiotics with azithromycin and ceftriaxone.  COVID 19 testing in progress.  He does remain in respiratory isolation at this time while this test is pending.    3.  Hypertension.  Start metoprolol 5 mg IV every 6 hours.  IV hydralazine if needed.    4.  Possible alcohol withdrawals.  Start Lorazepam withdrawal protocol with IV vitamins.      Diet: NPO for Medical/Clinical Reasons Except for: Meds, Ice Chips    DVT Prophylaxis: Pneumatic Compression Devices  Garcia Catheter: not present  Code Status: Full Code      Disposition Plan   Expected discharge: 1-2 days, recommended to prior living arrangement     Spencer Gan, DO  Hospitalist Service  [facility]    ______________________________________________________________________    Interval History   Having occasional abdominal pain.  Improved from previous.  Occasional nausea. no longer having a cough.  Did have fevers yesterday.  No chest pain, shortness of breath, or diarrhea.    Data  reviewed today: I reviewed all medications, new labs and imaging results over the last 24 hours.     Physical Exam   Vital Signs: Temp: 100.3  F (37.9  C) Temp src: Oral BP: (!) 165/86 Pulse: 104 Heart Rate: 94 Resp: 16 SpO2: 94 % O2 Device: None (Room air)    Weight: 270 lbs 0 oz  Gen:  NAD, A&Ox3.  Eyes:  PERRL, sclera anicteric.  OP:  MMM, no lesions.  Neck:  Supple.  CV:  Regular, no murmurs.  Lung:  CTA b/l, normal effort.  Ab:  +BS, soft.  Skin:  Warm, dry to touch.  No rash.  Ext:  No pitting edema LE b/l.      Data   Recent Labs   Lab 03/25/20  0622 03/24/20  0650 03/23/20  2224   WBC 18.2* 14.7* 20.1*   HGB 16.1 15.8 16.6   * 102* 97    241 300    138 138   POTASSIUM 4.0 4.1 4.2   CHLORIDE 105 106 103   CO2 28 25 29   BUN 9 14 13   CR 0.92 1.03 1.14   ANIONGAP 4 7 6   MIRIAN 8.5 8.4* 8.9   * 139* 184*   ALBUMIN 3.7  --  4.2   PROTTOTAL 7.3  --  7.6   BILITOTAL 1.5*  --  0.6   ALKPHOS 69  --  77   ALT 20  --  33   AST 14  --  22   LIPASE 1,893* 7,929* 28,587*

## 2020-03-25 NOTE — PLAN OF CARE
/76 (BP Location: Right arm)   Pulse 104   Temp 98.7  F (37.1  C) (Oral)   Resp 16   Ht 1.829 m (6')   Wt 122.5 kg (270 lb)   SpO2 94%   BMI 36.62 kg/m     BP elevated managed w/ IV lopressor Q 6 hrs.  Pt is A&Ox4, LS Diminished on RA,  BS+, Abd pain given oxy x 4 for pain 2-6/10. Abd is rounded and slightly tender. NPO w/ ice chips. Denies N/T, Nausea and SOB. Good UOP. Pt has +1 edema to R ankle, states normal. NS running at 100 ml/hr. Receiving infuvite Q24 @ 100 cc/hr. Started CIWA scored 5 & 5. Getting IV Rocephin and Zithromax Q 24 hrs.  COVID-19 ruled out, Test Negative. Up IND. Plan wait for abd pain to subside, start diet after.

## 2020-03-25 NOTE — PLAN OF CARE
A&O x 4, independent, NPO except meds & ice chips, contact & droplet iso for R/O COVID, O2 92-93% RA, elevated temp, LR infusing 125 mL/hr, oxy & dilaudid given for abdomen pain, will continue with POC.

## 2020-03-26 LAB
ALBUMIN SERPL-MCNC: 3.3 G/DL (ref 3.4–5)
ALP SERPL-CCNC: 60 U/L (ref 40–150)
ALT SERPL W P-5'-P-CCNC: 18 U/L (ref 0–70)
ANION GAP SERPL CALCULATED.3IONS-SCNC: 6 MMOL/L (ref 3–14)
AST SERPL W P-5'-P-CCNC: 14 U/L (ref 0–45)
BILIRUB SERPL-MCNC: 1.2 MG/DL (ref 0.2–1.3)
BUN SERPL-MCNC: 12 MG/DL (ref 7–30)
CALCIUM SERPL-MCNC: 8.3 MG/DL (ref 8.5–10.1)
CHLORIDE SERPL-SCNC: 104 MMOL/L (ref 94–109)
CO2 SERPL-SCNC: 25 MMOL/L (ref 20–32)
COVID-19 VIRUS PCR RESULT FROM MDH: NEGATIVE
CREAT SERPL-MCNC: 0.93 MG/DL (ref 0.66–1.25)
GFR SERPL CREATININE-BSD FRML MDRD: >90 ML/MIN/{1.73_M2}
GLUCOSE SERPL-MCNC: 96 MG/DL (ref 70–99)
LIPASE SERPL-CCNC: 329 U/L (ref 73–393)
POTASSIUM SERPL-SCNC: 3.9 MMOL/L (ref 3.4–5.3)
PROT SERPL-MCNC: 6.8 G/DL (ref 6.8–8.8)
SODIUM SERPL-SCNC: 135 MMOL/L (ref 133–144)

## 2020-03-26 PROCEDURE — 25000125 ZZHC RX 250: Performed by: INTERNAL MEDICINE

## 2020-03-26 PROCEDURE — 99207 ZZC MOONLIGHTING INDICATOR: CPT | Performed by: INTERNAL MEDICINE

## 2020-03-26 PROCEDURE — 25000128 H RX IP 250 OP 636: Performed by: INTERNAL MEDICINE

## 2020-03-26 PROCEDURE — 36415 COLL VENOUS BLD VENIPUNCTURE: CPT | Performed by: INTERNAL MEDICINE

## 2020-03-26 PROCEDURE — 12000000 ZZH R&B MED SURG/OB

## 2020-03-26 PROCEDURE — 25000132 ZZH RX MED GY IP 250 OP 250 PS 637: Performed by: INTERNAL MEDICINE

## 2020-03-26 PROCEDURE — 99233 SBSQ HOSP IP/OBS HIGH 50: CPT | Performed by: INTERNAL MEDICINE

## 2020-03-26 PROCEDURE — 25800030 ZZH RX IP 258 OP 636: Performed by: INTERNAL MEDICINE

## 2020-03-26 PROCEDURE — 83690 ASSAY OF LIPASE: CPT | Performed by: INTERNAL MEDICINE

## 2020-03-26 PROCEDURE — 80053 COMPREHEN METABOLIC PANEL: CPT | Performed by: INTERNAL MEDICINE

## 2020-03-26 RX ADMIN — OXYCODONE HYDROCHLORIDE 10 MG: 5 TABLET ORAL at 22:47

## 2020-03-26 RX ADMIN — OXYCODONE HYDROCHLORIDE 10 MG: 5 TABLET ORAL at 02:52

## 2020-03-26 RX ADMIN — CEFTRIAXONE 2 G: 2 INJECTION, POWDER, FOR SOLUTION INTRAMUSCULAR; INTRAVENOUS at 07:45

## 2020-03-26 RX ADMIN — FOLIC ACID: 5 INJECTION, SOLUTION INTRAMUSCULAR; INTRAVENOUS; SUBCUTANEOUS at 15:20

## 2020-03-26 RX ADMIN — METOPROLOL TARTRATE 5 MG: 5 INJECTION INTRAVENOUS at 02:38

## 2020-03-26 RX ADMIN — METOPROLOL TARTRATE 5 MG: 5 INJECTION INTRAVENOUS at 07:45

## 2020-03-26 RX ADMIN — AZITHROMYCIN MONOHYDRATE 500 MG: 500 INJECTION, POWDER, LYOPHILIZED, FOR SOLUTION INTRAVENOUS at 08:47

## 2020-03-26 RX ADMIN — OXYCODONE HYDROCHLORIDE 5 MG: 5 TABLET ORAL at 07:45

## 2020-03-26 RX ADMIN — METOPROLOL TARTRATE 5 MG: 5 INJECTION INTRAVENOUS at 15:12

## 2020-03-26 RX ADMIN — OXYCODONE HYDROCHLORIDE 10 MG: 5 TABLET ORAL at 15:20

## 2020-03-26 RX ADMIN — HYDROMORPHONE HYDROCHLORIDE 0.5 MG: 1 INJECTION, SOLUTION INTRAMUSCULAR; INTRAVENOUS; SUBCUTANEOUS at 01:09

## 2020-03-26 RX ADMIN — METOPROLOL TARTRATE 5 MG: 5 INJECTION INTRAVENOUS at 20:41

## 2020-03-26 RX ADMIN — OXYCODONE HYDROCHLORIDE 10 MG: 5 TABLET ORAL at 19:07

## 2020-03-26 RX ADMIN — SODIUM CHLORIDE, PRESERVATIVE FREE: 5 INJECTION INTRAVENOUS at 06:21

## 2020-03-26 RX ADMIN — OXYCODONE HYDROCHLORIDE 5 MG: 5 TABLET ORAL at 11:55

## 2020-03-26 ASSESSMENT — ACTIVITIES OF DAILY LIVING (ADL)
ADLS_ACUITY_SCORE: 12

## 2020-03-26 NOTE — PROGRESS NOTES
[facility]    Medicine Progress Note - Hospitalist Service       Date of Admission:  3/23/2020  Assessment & Plan   Mr. Holguin is a 60-year-old male with a past medical history significant for nephrolithiasis.  He had initially presented to emergency room with abdominal pain.  He was found to have acute pancreatitis.  This is likely related to recent binge drinking on a trip to [place].    1.  Acute pancreatitis.  Likely related to recent heavy alcohol use.  Symptoms are mildly improved today.  Advanced to clear liquids. Did advise avoidance of alcohol in the future.    2.  Possible pneumonia.  Did have some very mild abnormalities on chest x-ray from yesterday.  Developed fever to 100.7  F yesterday afternoon.  Does have a cough.  Now start antibiotics with azithromycin and ceftriaxone.  COVID 19 testing negative.     3.  Hypertension.  Start metoprolol 5 mg IV every 6 hours.  IV hydralazine if needed.    4.  Possible alcohol withdrawals.  Start Lorazepam withdrawal protocol with IV vitamins.      Diet: Advance Diet as Tolerated: Clear Liquid Diet    DVT Prophylaxis: Pneumatic Compression Devices  Garcia Catheter: not present  Code Status: Full Code      Disposition Plan   Expected discharge: 1-2 days, recommended to prior living arrangement     Ronnie yHde MD  Hospitalist Service  [facility]    _______________________________________________________    Interval History   Advanced to clear liquid diet. No chest pain, shortness of breath, or diarrhea. COVID-19 negative.     Data reviewed today: I reviewed all medications, new labs and imaging results over the last 24 hours.     Physical Exam   Vital Signs: Temp: 93.2  F (34  C) Temp src: Oral BP: (!) 145/74   Heart Rate: 83 Resp: 16 SpO2: 96 % O2 Device: None (Room air)    Weight: 270 lbs 0 oz  Gen:  NAD, A&Ox3.  Eyes:  PERRL, sclera anicteric.  OP:  MMM, no lesions.  Neck:  Supple.  CV:  Regular, no murmurs.  Lung:  CTA b/l, normal  effort.  Ab:  +BS, soft.  Skin:  Warm, dry to touch.  No rash.  Ext:  No pitting edema LE b/l.      Data   Recent Labs   Lab 03/26/20  0616 03/25/20  0622 03/24/20  0650 03/23/20  2224   WBC  --  18.2* 14.7* 20.1*   HGB  --  16.1 15.8 16.6   MCV  --  102* 102* 97   PLT  --  230 241 300    137 138 138   POTASSIUM 3.9 4.0 4.1 4.2   CHLORIDE 104 105 106 103   CO2 25 28 25 29   BUN 12 9 14 13   CR 0.93 0.92 1.03 1.14   ANIONGAP 6 4 7 6   MIRIAN 8.3* 8.5 8.4* 8.9   GLC 96 106* 139* 184*   ALBUMIN 3.3* 3.7  --  4.2   PROTTOTAL 6.8 7.3  --  7.6   BILITOTAL 1.2 1.5*  --  0.6   ALKPHOS 60 69  --  77   ALT 18 20  --  33   AST 14 14  --  22   LIPASE 329 1,893* 7,929* 28,587*

## 2020-03-26 NOTE — PLAN OF CARE
Presentation/Diagnosis: abdominal pain, fever; alcohol-induced acute pancreatitis. COVID-19 test negative.  Vitals: /77 (BP Location: Right arm)   Pulse 104   Temp 99.4  F (37.4  C) (Oral)   Resp 18   Ht 1.829 m (6')   Wt 122.5 kg (270 lb)   SpO2 90%   BMI 36.62 kg/m      Cardiac: bilateral edema to LE  Respiratory: WNL  Neuro: A&O x4  GI/: Pt reporting abdominal pain. Worse when lying on L or R side. Dilaudid and oxycodone given.  Skin: WNL  LDAs: piv infusing  Plan: possible discharge today.

## 2020-03-26 NOTE — PLAN OF CARE
BP (!) 155/73 (BP Location: Left arm)   Pulse 104   Temp 98.7  F (37.1  C) (Oral)   Resp 16   Ht 1.829 m (6')   Wt 122.5 kg (270 lb)   SpO2 96%   BMI 36.62 kg/m     BP elevated managed w/ IV lopressor Q 6 hrs.  Pt is A&Ox4, LS Diminished on RA,  BS+ Passing gas, Abd pain given oxy x 4 for pain 5/10. Abd is rounded and slightly tender. Clear liquid and tolerating, can be advanced to full liquid diet. Denies N/T, Nausea and SOB. Good UOP. Pt has +1 edema to R ankle, states normal. NS running at 100 ml/hr. Receiving infuvite Q24 @ 100 cc/hr. CIWA scored 1, 3, 3. Temp has fluctuated between 98 and 100.3 F. Getting IV Rocephin and Zithromax Q 24 hrs.  Up IND. Plan to discharge in 1-2 days to prior living arrangement.

## 2020-03-27 LAB
ANION GAP SERPL CALCULATED.3IONS-SCNC: 5 MMOL/L (ref 3–14)
BUN SERPL-MCNC: 11 MG/DL (ref 7–30)
CALCIUM SERPL-MCNC: 8.1 MG/DL (ref 8.5–10.1)
CHLORIDE SERPL-SCNC: 103 MMOL/L (ref 94–109)
CO2 SERPL-SCNC: 27 MMOL/L (ref 20–32)
CREAT SERPL-MCNC: 0.93 MG/DL (ref 0.66–1.25)
ERYTHROCYTE [DISTWIDTH] IN BLOOD BY AUTOMATED COUNT: 13.2 % (ref 10–15)
GFR SERPL CREATININE-BSD FRML MDRD: >90 ML/MIN/{1.73_M2}
GLUCOSE SERPL-MCNC: 90 MG/DL (ref 70–99)
HCT VFR BLD AUTO: 43.2 % (ref 40–53)
HGB BLD-MCNC: 14.1 G/DL (ref 13.3–17.7)
LACTATE BLD-SCNC: 0.7 MMOL/L (ref 0.7–2)
MCH RBC QN AUTO: 32.6 PG (ref 26.5–33)
MCHC RBC AUTO-ENTMCNC: 32.6 G/DL (ref 31.5–36.5)
MCV RBC AUTO: 100 FL (ref 78–100)
PLATELET # BLD AUTO: 217 10E9/L (ref 150–450)
POTASSIUM SERPL-SCNC: 3.7 MMOL/L (ref 3.4–5.3)
RBC # BLD AUTO: 4.33 10E12/L (ref 4.4–5.9)
SODIUM SERPL-SCNC: 135 MMOL/L (ref 133–144)
WBC # BLD AUTO: 16.8 10E9/L (ref 4–11)

## 2020-03-27 PROCEDURE — 80048 BASIC METABOLIC PNL TOTAL CA: CPT | Performed by: INTERNAL MEDICINE

## 2020-03-27 PROCEDURE — 25000128 H RX IP 250 OP 636: Performed by: INTERNAL MEDICINE

## 2020-03-27 PROCEDURE — 25000132 ZZH RX MED GY IP 250 OP 250 PS 637: Performed by: INTERNAL MEDICINE

## 2020-03-27 PROCEDURE — 36415 COLL VENOUS BLD VENIPUNCTURE: CPT | Performed by: INTERNAL MEDICINE

## 2020-03-27 PROCEDURE — 25800030 ZZH RX IP 258 OP 636: Performed by: INTERNAL MEDICINE

## 2020-03-27 PROCEDURE — 83605 ASSAY OF LACTIC ACID: CPT | Performed by: INTERNAL MEDICINE

## 2020-03-27 PROCEDURE — 12000000 ZZH R&B MED SURG/OB

## 2020-03-27 PROCEDURE — 25000125 ZZHC RX 250: Performed by: INTERNAL MEDICINE

## 2020-03-27 PROCEDURE — 85027 COMPLETE CBC AUTOMATED: CPT | Performed by: INTERNAL MEDICINE

## 2020-03-27 PROCEDURE — 99232 SBSQ HOSP IP/OBS MODERATE 35: CPT | Performed by: INTERNAL MEDICINE

## 2020-03-27 RX ORDER — LANOLIN ALCOHOL/MO/W.PET/CERES
100 CREAM (GRAM) TOPICAL DAILY
Status: DISCONTINUED | OUTPATIENT
Start: 2020-03-27 | End: 2020-03-28 | Stop reason: HOSPADM

## 2020-03-27 RX ORDER — OXYCODONE HYDROCHLORIDE 5 MG/1
5 TABLET ORAL
Status: DISCONTINUED | OUTPATIENT
Start: 2020-03-27 | End: 2020-03-28 | Stop reason: HOSPADM

## 2020-03-27 RX ORDER — AZITHROMYCIN 250 MG/1
250 TABLET, FILM COATED ORAL DAILY
Status: DISCONTINUED | OUTPATIENT
Start: 2020-03-28 | End: 2020-03-28 | Stop reason: HOSPADM

## 2020-03-27 RX ORDER — MULTIPLE VITAMINS W/ MINERALS TAB 9MG-400MCG
1 TAB ORAL DAILY
Status: DISCONTINUED | OUTPATIENT
Start: 2020-03-27 | End: 2020-03-28 | Stop reason: HOSPADM

## 2020-03-27 RX ORDER — FOLIC ACID 1 MG/1
1 TABLET ORAL DAILY
Status: DISCONTINUED | OUTPATIENT
Start: 2020-03-27 | End: 2020-03-28 | Stop reason: HOSPADM

## 2020-03-27 RX ADMIN — OXYCODONE HYDROCHLORIDE 5 MG: 5 TABLET ORAL at 10:21

## 2020-03-27 RX ADMIN — ACETAMINOPHEN 650 MG: 325 TABLET, FILM COATED ORAL at 20:10

## 2020-03-27 RX ADMIN — METOPROLOL TARTRATE 5 MG: 5 INJECTION INTRAVENOUS at 04:12

## 2020-03-27 RX ADMIN — CEFTRIAXONE 2 G: 2 INJECTION, POWDER, FOR SOLUTION INTRAMUSCULAR; INTRAVENOUS at 08:24

## 2020-03-27 RX ADMIN — OXYCODONE HYDROCHLORIDE 5 MG: 5 TABLET ORAL at 16:57

## 2020-03-27 RX ADMIN — OXYCODONE HYDROCHLORIDE 5 MG: 5 TABLET ORAL at 13:41

## 2020-03-27 RX ADMIN — AZITHROMYCIN MONOHYDRATE 500 MG: 500 INJECTION, POWDER, LYOPHILIZED, FOR SOLUTION INTRAVENOUS at 09:10

## 2020-03-27 RX ADMIN — FOLIC ACID 1 MG: 1 TABLET ORAL at 10:21

## 2020-03-27 RX ADMIN — SODIUM CHLORIDE, PRESERVATIVE FREE: 5 INJECTION INTRAVENOUS at 01:48

## 2020-03-27 RX ADMIN — MULTIPLE VITAMINS W/ MINERALS TAB 1 TABLET: TAB at 10:21

## 2020-03-27 RX ADMIN — THIAMINE HCL TAB 100 MG 100 MG: 100 TAB at 10:21

## 2020-03-27 RX ADMIN — OXYCODONE HYDROCHLORIDE 10 MG: 5 TABLET ORAL at 07:38

## 2020-03-27 RX ADMIN — OXYCODONE HYDROCHLORIDE 10 MG: 5 TABLET ORAL at 02:02

## 2020-03-27 ASSESSMENT — ACTIVITIES OF DAILY LIVING (ADL)
ADLS_ACUITY_SCORE: 12

## 2020-03-27 NOTE — PLAN OF CARE
Presentation/Diagnosis: Pt admitted on 3/23 for evaluation of flank pain   History: Kidney stones  Labs/Protocols: Potassium/Mag Protocol: Na-135, K+-3.7, Mag-2.0(3/25)  Vitals: Temp: 96  F (35.6  C) Temp src: Oral BP: 135/69   Heart Rate: 84 Resp: 18 SpO2: 97 % on Room Air   Cardiac: Trace edema note to bilateral lower extremities   Respiratory: Lungs are clear throughout-Change to oral zithromax tomorrow-Continue with IV rocephin Q 24 hrs for pneumonia   Neuro: A&Ox4   GI/: Continent of B/B-Abdominal pain has been a 4-5/10 this shift received oxycodone PRN Q 3hrs   Skin: Intact   LDAs: PIV Left hand-Saline locked   Diet: Fulls-Tolerating this denies pain with food   Activity: IND   Teaching: Pt educated on current POC-Transition abx to oral-manage pain-Change diet to fulls and transition to low fat tomorrow   Plan: Discharge possibly tomorrow

## 2020-03-27 NOTE — PLAN OF CARE
4/10 pain, gave 10 mg oxycodone. Decrease in pain. CIWA 1,1,2.   /70. R. PIV removed due to leaking. L PIV infusing  ml/hour. Independent. +1 edema in legs/ankles/feet. Clear liquid diet.

## 2020-03-27 NOTE — PROVIDER NOTIFICATION
Web based paging  Pt oral temp 100.2, if you want me to give Tylenol, please edit Tylenol for fever too. Thanks.

## 2020-03-27 NOTE — PROGRESS NOTES
[facility]    Hospitalist Progress Note      Assessment & Plan   Sheldon Holguin is a 60 year old male who was admitted on 3/23/2020.    Summary of Stay:   Mr. Holguin is a 60-year-old male with a past medical history significant for nephrolithiasis.  He had initially presented to emergency room with abdominal pain.  He was found to have acute pancreatitis.  This is likely related to recent binge drinking on a trip to [place].  According to him, he does not drink significant amount of alcohol on a regular basis but admits binge drinking on [place] Day.    He was also started on antibiotics for possible pneumonia.  COVID 19 testing negative.     Plan:    Acute pancreatitis  - Likely related to alcohol use.  -Ultrasound abdomen was negative for cholelithiasis.  -No previous episode of pancreatitis.  -Improving.  -Start on a clear liquid diet.  If tolerates then advance to a full liquid diet from tomorrow morning.  - If tolerating the diet then potential discharge tomorrow.    Possible community-acquired pneumonia  - Continue ceftriaxone and azithromycin.  - Leukocytosis improving    Ambulate ad barbara.    DVT Prophylaxis: Pneumatic Compression Devices  Code Status: Full Code  Expected discharge: Tomorrow,    Grabiel Mike MD  Text Page (7am - 6pm, M-F)    Interval History   Patient was evaluated with nursing staff. Overnight issues discussed.    Review of systems:  No nausea or vomiting.  Abdominal pain is significantly better.  No chest pain/palpitations.  No new cough/shortness of breath.  No headache/visual disturbance/new weakness.    -Data reviewed today: Labs and medications.    Physical Exam   Temp: 96  F (35.6  C) Temp src: Oral BP: 135/69   Heart Rate: 84 Resp: 18 SpO2: 97 % O2 Device: Nasal cannula Oxygen Delivery: 1 LPM  Vitals:    03/23/20 2215   Weight: 122.5 kg (270 lb)     Vital Signs with Ranges  Temp:  [96  F (35.6  C)-100.2  F (37.9  C)] 96  F (35.6  C)  Heart Rate:  [84-89] 84  Resp:   [18] 18  BP: (135-155)/(68-76) 135/69  SpO2:  [91 %-97 %] 97 %  I/O last 3 completed shifts:  In: 960 [P.O.:960]  Out: -     Constitutional: Awake, alert, cooperative, no apparent distress  HEENT: Trachea midline, sclera is clear   Respiratory: Clear to auscultation bilaterally, no crackles or wheezing  Cardiovascular: Regular rate and rhythm, normal S1 and S2, and no murmur noted  GI: Normal bowel sounds, soft, non-distended, mild epigastric tenderness.    Medications       [START ON 3/28/2020] azithromycin  250 mg Oral Daily     cefTRIAXone  2 g Intravenous Q24H     folic acid  1 mg Oral Daily     multivitamin w/minerals  1 tablet Oral Daily     sodium chloride (PF)  3 mL Intracatheter Q8H     vitamin B1  100 mg Oral Daily       Data   Recent Labs   Lab 03/27/20  0547 03/26/20  0616 03/25/20  0622 03/24/20  0650   WBC 16.8*  --  18.2* 14.7*   HGB 14.1  --  16.1 15.8     --  102* 102*     --  230 241    135 137 138   POTASSIUM 3.7 3.9 4.0 4.1   CHLORIDE 103 104 105 106   CO2 27 25 28 25   BUN 11 12 9 14   CR 0.93 0.93 0.92 1.03   ANIONGAP 5 6 4 7   MIRIAN 8.1* 8.3* 8.5 8.4*   GLC 90 96 106* 139*   ALBUMIN  --  3.3* 3.7  --    PROTTOTAL  --  6.8 7.3  --    BILITOTAL  --  1.2 1.5*  --    ALKPHOS  --  60 69  --    ALT  --  18 20  --    AST  --  14 14  --    LIPASE  --  329 1,893* 7,929*       No results found for this or any previous visit (from the past 24 hour(s)).

## 2020-03-28 VITALS
RESPIRATION RATE: 18 BRPM | TEMPERATURE: 97.8 F | DIASTOLIC BLOOD PRESSURE: 82 MMHG | OXYGEN SATURATION: 96 % | HEIGHT: 72 IN | WEIGHT: 270 LBS | SYSTOLIC BLOOD PRESSURE: 156 MMHG | BODY MASS INDEX: 36.57 KG/M2 | HEART RATE: 104 BPM

## 2020-03-28 PROCEDURE — 99239 HOSP IP/OBS DSCHRG MGMT >30: CPT | Performed by: HOSPITALIST

## 2020-03-28 PROCEDURE — 25000132 ZZH RX MED GY IP 250 OP 250 PS 637: Performed by: INTERNAL MEDICINE

## 2020-03-28 PROCEDURE — 25000128 H RX IP 250 OP 636: Performed by: INTERNAL MEDICINE

## 2020-03-28 RX ORDER — CEFUROXIME AXETIL 500 MG/1
500 TABLET ORAL 2 TIMES DAILY
Qty: 6 TABLET | Refills: 0 | Status: SHIPPED | OUTPATIENT
Start: 2020-03-28

## 2020-03-28 RX ORDER — AZITHROMYCIN 250 MG/1
250 TABLET, FILM COATED ORAL DAILY
Qty: 3 TABLET | Refills: 0 | Status: SHIPPED | OUTPATIENT
Start: 2020-03-28

## 2020-03-28 RX ORDER — OXYCODONE HYDROCHLORIDE 5 MG/1
5 TABLET ORAL EVERY 6 HOURS PRN
Qty: 5 TABLET | Refills: 0 | Status: SHIPPED | OUTPATIENT
Start: 2020-03-28

## 2020-03-28 RX ADMIN — FOLIC ACID 1 MG: 1 TABLET ORAL at 08:51

## 2020-03-28 RX ADMIN — CEFTRIAXONE 2 G: 2 INJECTION, POWDER, FOR SOLUTION INTRAMUSCULAR; INTRAVENOUS at 08:55

## 2020-03-28 RX ADMIN — ACETAMINOPHEN 650 MG: 325 TABLET, FILM COATED ORAL at 01:54

## 2020-03-28 RX ADMIN — OXYCODONE HYDROCHLORIDE 5 MG: 5 TABLET ORAL at 08:02

## 2020-03-28 RX ADMIN — ACETAMINOPHEN 650 MG: 325 TABLET, FILM COATED ORAL at 11:32

## 2020-03-28 RX ADMIN — THIAMINE HCL TAB 100 MG 100 MG: 100 TAB at 08:50

## 2020-03-28 RX ADMIN — MULTIPLE VITAMINS W/ MINERALS TAB 1 TABLET: TAB at 08:50

## 2020-03-28 RX ADMIN — AZITHROMYCIN MONOHYDRATE 250 MG: 250 TABLET ORAL at 08:50

## 2020-03-28 ASSESSMENT — ACTIVITIES OF DAILY LIVING (ADL)
ADLS_ACUITY_SCORE: 12

## 2020-03-28 NOTE — PLAN OF CARE
Alert and oriented x4. VSS. C/O [place]. Lower abdominal pain, PRN oxy given x1 and PRN tylenol given x1. Up ad barbara in room. On low fat diet, good appetite. Voiding well.    Went over discharge paperwork with pt. All questions asked and answered. Pt verbalizes understanding of discharge. IV taken out. Medications handed to pt. Wife will be transporting pt home. Belongings, discharge paperwork, and medications sent home with pt. Pt walked down to elevators.

## 2020-03-28 NOTE — PLAN OF CARE
VSS ex temp tmax 101.2. PRN tylenol. Recheck 98.9. PRN oxy for abd pain with relief. Tolerating full liquid diet. Plan to advance diet and possible discharge tomorrow. Iv rocephin and PO zithromax. Will continue to monitor.

## 2020-03-28 NOTE — DISCHARGE SUMMARY
Hospitalist Discharge Summary  [facility]    Sheldon Holguin MRN# [id number]   YOB: 1960 Age: 60 year old     Date of Admission:  3/23/2020  Date of Discharge:  3/28/2020  Admitting Physician:  Asael Cochran MD  Discharge Physician:  Sheldon Morrison DO  Discharging Service:  Hospitalist     Primary Provider: Pallas, Kenneth G          Discharge Diagnosis:   Acute alcoholic pancreatitis  CAP             Discharge Disposition:   Discharged to home           Allergies:   No Known Allergies           Discharge Medications:   Current Discharge Medication List      START taking these medications    Details   azithromycin (ZITHROMAX) 250 MG tablet Take 1 tablet (250 mg) by mouth daily  Qty: 3 tablet, Refills: 0    Associated Diagnoses: Alcohol-induced acute pancreatitis, unspecified complication status      cefuroxime (CEFTIN) 500 MG tablet Take 1 tablet (500 mg) by mouth 2 times daily  Qty: 6 tablet, Refills: 0    Associated Diagnoses: Alcohol-induced acute pancreatitis, unspecified complication status      oxyCODONE (ROXICODONE) 5 MG tablet Take 1 tablet (5 mg) by mouth every 6 hours as needed for severe pain  Qty: 5 tablet, Refills: 0    Associated Diagnoses: Alcohol-induced acute pancreatitis, unspecified complication status         CONTINUE these medications which have NOT CHANGED    Details   multivitamin w/minerals (THERA-VIT-M) tablet Take 1 tablet by mouth daily                    Condition on Discharge:   Discharge condition: Stable   Discharge vitals: Blood pressure (!) 156/82, pulse 104, temperature 98.9  F (37.2  C), temperature source Oral, resp. rate 18, height 1.829 m (6'), weight 122.5 kg (270 lb), SpO2 96 %.   Code status on discharge: Full Code      BASIC PHYSICAL EXAMINATION:  GENERAL: No apparent distress.  CARDIOVASCULAR: Regular rate and rhythm without murmurs.  PULMONARY: Clear to auscultation bilaterally.   GASTROINTESTINAL: Abdomen soft, non-tender.  EXTREMITIES:  No edema, pulses intact.  NEUROLOGIC: No focal deficits.            History of Illness:   See detailed admission note for full details.               Procedures excluding imaging which is summarized below:   Please see details in the electronic medical record.           Consultations:   None          Significant Results:   Results for orders placed or performed during the hospital encounter of 03/23/20   CT Abdomen Pelvis w Contrast    Narrative    EXAM: CT ABDOMEN PELVIS W CONTRAST  LOCATION: [facility]  DATE/TIME: 3/23/2020 11:16 PM    INDICATION: Central abdominal pain with slightly more focal tenderness in the left lower quadrant of the abdomen with guarding on physical examination.    COMPARISON: None.    TECHNIQUE: CT scan of the abdomen and pelvis was performed following injection of IV contrast. Multiplanar reformats were obtained. Dose reduction techniques were used.    CONTRAST: 100 mL Isovue-370.    FINDINGS:   LOWER CHEST: Minimal linear atelectasis involving both lower lungs. No focal infiltrate, consolidation or pleural fluid.    HEPATOBILIARY: Diffuse fatty infiltration of the liver. No calcified gallstones or biliary dilatation. Portal vein patent.    PANCREAS: Diffuse pancreatic enlargement with diffuse interstitial edema throughout the entirety of the pancreas. Findings compatible with acute interstitial edematous pancreatitis. No evidence for necrotizing pancreatitis or ductal dilatation. Minimal   to moderate peripancreatic fluid conforming to the borders of the retroperitoneum compatible with acute peripancreatic fluid collection. No evidence for pancreatic necrosis.    SPLEEN: Normal-sized spleen. Well-circumscribed low-attenuation lesion superior aspect of the spleen likely representing a small cyst or hemangioma. Splenic vein patent. No definitive evidence for splenic artery pseudoaneurysm.    ADRENAL GLANDS: No significant nodules.    KIDNEYS/BLADDER: Symmetric contrast intake  into both kidneys. Bilateral subcentimeter low-attenuation renal lesions most compatible with cysts, for which there are no further follow-up recommendations. No urinary collecting system dilatation. No overt   bladder or prostate abnormality.    BOWEL: Colonic diverticulosis without evidence for diverticulitis. Appendix unremarkable. No small bowel dilatation. Mild reactive bowel wall thickening to the second and third portions of the duodenum due to the inflammatory change along the   duodenal/pancreatic groove. There is suggestion of mild wall thickening to the greater curvature of the stomach due to adjacent inflammatory change. Distal esophagus unremarkable.    LYMPH NODES: No lymphadenopathy.    VASCULATURE: Normal caliber abdominal aorta. Atherosclerotic vascular calcification.    MUSCULOSKELETAL: Chronic bilateral L5 pars interarticularis defects without significant spondylolisthesis of L5 on S1. Minimal scattered areas of degenerative disc changes throughout the thoracic and lumbar spine.      Impression    IMPRESSION:   1.  Evidence for acute interstitial edematous pancreatitis with diffuse enlargement of the pancreas without evidence for necrotizing pancreatitis. No ductal dilatation. Associated acute peripancreatic fluid collection conforming to the borders of the   retroperitoneum. No evidence for peripancreatic necrosis.    2.  No evidence for common bile duct dilatation. No evidence for thrombus of the splenic vein or pseudoaneurysm formation.    3.  Diffuse fatty infiltration of the liver.    4.  Low-attenuation lesion superior aspect of the spleen likely represents a cyst or hemangioma.    5.  Colonic diverticulosis without diverticulitis.     US Abdomen Complete    Narrative    ULTRASOUND ABDOMEN COMPLETE   3/24/2020 9:54 AM     HISTORY: Evaluate for gallstones, common duct dilation, acute  pancreatitis.    COMPARISON: None.    FINDINGS:    Gallbladder: Normal with no cholelithiasis, wall  thickening or focal  tenderness.      Bile ducts: CHD is normal diameter. No intrahepatic biliary  dilatation.    Liver: Fatty infiltration of the liver is suggested.    Pancreas: Partially obscured by overlying bowel gas, but grossly  unremarkable.     Spleen: Normal.     Right kidney: Normal.     Left kidney: Normal.    Aorta and IVC: Not specifically assessed.      Impression    IMPRESSION:    1. No gallstones identified. Negative sonographic Luu's sign. No  biliary dilatation.  2. Fatty liver.    MSITY HARVEY MD   XR Chest Port 1 View    Narrative    CHEST PORTABLE ONE VIEW   3/24/2020 9:20 AM     HISTORY: Cough.    COMPARISON: None.      Impression    IMPRESSION: Hypoinflated lungs. Bibasilar atelectasis. Cannot exclude  airspace consolidation including pneumonia at the left lung base.  Normal cardiac silhouette. Mild vascular congestion.    MISTY HARVEY MD       Transthoracic Echocardiogram Results:  No results found for this or any previous visit (from the past 4320 hour(s)).             Pending Results:   Unresulted Labs Ordered in the Past 30 Days of this Admission     No orders found from 2/22/2020 to 3/24/2020.                      Discharge Instructions and Follow-Up:   Discharge instructions and follow-up:   Discharge Procedure Orders   Follow-up and recommended labs and tests    Order Comments: Follow up with primary care provider, Kenneth G. Pallas, within 7 days for hospital follow- up.  No follow up labs or test are needed.  Avoid alcohol.  The patient was provided with a limited supply of oral narcotic medication, as it was indicated by their medical condition.  The patient was instructed not to take the pain medication above the prescribed dose and frequency due to the potential for addiction, respiratory depression, and even death. The patient expressed understanding of these instructions, is willing to accept these inherent risks, and verbally contracted not to misuse the medication.      Activity   Order Comments: Your activity upon discharge: activity as tolerated and no driving while on analgesics     Order Specific Question Answer Comments   Is discharge order? Yes      Full Code     Order Specific Question Answer Comments   Code status determined by: Discussion with patient/legal decision maker      Diet   Order Comments: Follow this diet upon discharge: Orders Placed This Encounter      Low Fat Diet     Order Specific Question Answer Comments   Is discharge order? Yes              Hospital Course:   Mr. Holguin is a 60-year-old male with a past medical history significant for nephrolithiasis.  He had initially presented to emergency room with abdominal pain.  He was found to have acute pancreatitis.  This is likely related to recent binge drinking on a trip to [place].  According to him, he does not drink significant amount of alcohol on a regular basis but admits binge drinking on [employer] [place] Day.  He was placed on bowel rest and IVF.  He gradually improved.  Pain initially a 9/10, now 2/10.  He is tolerating low fat diet.  CT abdomen was reassuring.       He was also started on antibiotics for possible pneumonia.  COVID 19 testing negative.  Has low grade fevers, possibly from pancreatic inflammation rather than PNA.  Clinically feeling much better.  Instructed to avoid alcohol consumption.  Given 5 tabs of oxycodone to use before sleep as needed for severe abdominal pain.  He contracts to use responsibly.     The patient was seen, examined, and counseled on this day. The hospitalization and plan of care was reviewed with the patient extensively. All questions were addressed and the patient agreed to follow-up as noted above.      Total time spent in face to face contact with the patient and coordinating discharge was:  35 Minutes    Sheldon Morrison DO, MPH  [facility]  [address] [place] [address]  Pager: [phone]  03/28/2020

## 2020-03-28 NOTE — PROVIDER NOTIFICATION
Text paged D: Would you like pt to get scheduled dose of IV rocephin before D/C? Please advise. Thank you.    MD aware. Ok to give last IV dose before discharge.

## 2020-03-28 NOTE — PLAN OF CARE
A&O. C/o abdominal pain, PRN Tylenol given. VSS. Independent with ambulation. Plan to discharge today.

## 2021-01-15 ENCOUNTER — HEALTH MAINTENANCE LETTER (OUTPATIENT)
Age: 61
End: 2021-01-15

## 2021-09-04 ENCOUNTER — HEALTH MAINTENANCE LETTER (OUTPATIENT)
Age: 61
End: 2021-09-04

## 2022-02-19 ENCOUNTER — HEALTH MAINTENANCE LETTER (OUTPATIENT)
Age: 62
End: 2022-02-19

## 2022-10-16 ENCOUNTER — HEALTH MAINTENANCE LETTER (OUTPATIENT)
Age: 62
End: 2022-10-16

## 2023-04-01 ENCOUNTER — HEALTH MAINTENANCE LETTER (OUTPATIENT)
Age: 63
End: 2023-04-01